# Patient Record
(demographics unavailable — no encounter records)

---

## 2024-12-12 NOTE — PROCEDURE
[] : The injection was done in 2 phases with the first phase being subcutaneous injection of lidocaine 1.5 cc subcutaneously as anesthetic. When adequate level of anesthesia was achieved, the Celestone injection was undertaken. [4th] : 4th finger [FreeTextEntry1] : KENALOG 40 mg injected; NO Celestone injected.

## 2024-12-12 NOTE — ASSESSMENT
[FreeTextEntry1] : Patient has new problem today, trigger finger left ring finger.  Patient was aware of A1 pulley tenderness and some weakness or interference with making tight fist and holding objects but triggering was less apparent. Physical exam shows obvious locking and triggering of left ring finger. I have reviewed treatment options risks and complications with patient.  Questions answered.  Following this patient requested and was treated with 2 phase cortisone injection into the left ring finger flexor sheath utilizing Kenalog 40 mg that was well-tolerated. Patient has no other active trigger finger at this time.  Patient has residual ulnar nerve injury that he is compensated for and seeks no treatment for. Patient has bilateral wrist SLAC but has modified activities and has infrequent pain.  Patient seeks no treatment for these other conditions today.  Prognosis uncertain. Patient should return if symptoms continue or recur.  Patient should return if he has new problems or recurrent problems for evaluation.  A lengthy and detailed discussion was held with the patient regarding analysis, treatment, and recommendations. All questions have been answered. At the conclusion the patient expressed acceptance, understanding and agreement with the plan.

## 2024-12-12 NOTE — PHYSICAL EXAM
[de-identified] : left wrist: dorso radial swelling, c/w synovitis and wrist arthritis. Tenderness over radial scaphoid interval Extension 30 degrees/flexion 45 degrees. Pain at maximum wrist extension and flexion. Basal joint manipulation 1+ crepitus, mild discomfort  Left hand Ring finger A1 pulley mildly tender.  Recreates complaint. With tight composite active fist, ring finger locks and then triggers into extension with pain recreating symptoms. There is no other A1 pulley tenderness or triggering in any other finger, left hand. Minimal Dupuytren's nodules across DPC with small cords but no contractures. No pertinent MP, PIP, or DIP joint contributory findings, except some Heberden's nodes; none are clinically painful.  Right Wrist: Swelling RS interval Mod RS jointline tenderness Shift test: stable, no pain Joint crepitus 2+, mild pain  Right hand: Dupuytren's disease fourth ray.  Fourth ray cord, DPC nodule without contracture. Ring finger A1 pulley nontender, not triggering with active fist or provocable testing. PIP 4 contracture 30 degrees. With maximum extension force applied PIP extends -10 degrees. No palpable Dupuytren's cord within ring finger. No other A1 pulley tenderness and no other triggering in any finger. No pertinent MP, PIP, or DIP joint contributory findings, except some Heberden's nodes; none are clinically painful.  Neurologic Right hand Ulnar innervated muscle atrophy, chronic, severe  Phalen's test with median nerve compression: negative. Left hand Sensation intact all fingers at rest Carpal tunnel surgical scar well-healed Phalen's test with median nerve compression: Negative  Skin: No cyanosis, clubbing, edema or rashes. Vascular: Radial pulses intact. Lymphatic: No streaking or epitrochlear adenopathy. The patient is awake, alert, and oriented. Affect appropriate. Cooperative.

## 2024-12-12 NOTE — PHYSICAL EXAM
[de-identified] : left wrist: dorso radial swelling, c/w synovitis and wrist arthritis. Tenderness over radial scaphoid interval Extension 30 degrees/flexion 45 degrees. Pain at maximum wrist extension and flexion. Basal joint manipulation 1+ crepitus, mild discomfort  Left hand Ring finger A1 pulley mildly tender.  Recreates complaint. With tight composite active fist, ring finger locks and then triggers into extension with pain recreating symptoms. There is no other A1 pulley tenderness or triggering in any other finger, left hand. Minimal Dupuytren's nodules across DPC with small cords but no contractures. No pertinent MP, PIP, or DIP joint contributory findings, except some Heberden's nodes; none are clinically painful.  Right Wrist: Swelling RS interval Mod RS jointline tenderness Shift test: stable, no pain Joint crepitus 2+, mild pain  Right hand: Dupuytren's disease fourth ray.  Fourth ray cord, DPC nodule without contracture. Ring finger A1 pulley nontender, not triggering with active fist or provocable testing. PIP 4 contracture 30 degrees. With maximum extension force applied PIP extends -10 degrees. No palpable Dupuytren's cord within ring finger. No other A1 pulley tenderness and no other triggering in any finger. No pertinent MP, PIP, or DIP joint contributory findings, except some Heberden's nodes; none are clinically painful.  Neurologic Right hand Ulnar innervated muscle atrophy, chronic, severe  Phalen's test with median nerve compression: negative. Left hand Sensation intact all fingers at rest Carpal tunnel surgical scar well-healed Phalen's test with median nerve compression: Negative  Skin: No cyanosis, clubbing, edema or rashes. Vascular: Radial pulses intact. Lymphatic: No streaking or epitrochlear adenopathy. The patient is awake, alert, and oriented. Affect appropriate. Cooperative.

## 2024-12-12 NOTE — HISTORY OF PRESENT ILLNESS
[FreeTextEntry1] : Patient is 67 yo RHD male first seen 10/29/2015 and most recently seen 2/15/2024.  Hand past history: 1. Lt SLAC II 2. Rt SLAC 3. L3 mallet deformity 4. Lt CTR 10/19/2016 5. Rt CTR 12/22/2020 6.  R4 trigger injection 2/15/2024 7.  L3 trigger injection 2/15/2024 8.  Rt chronic ulnar neuropathy; right hand muscle atrophy and wasting 9.  Right hand atrophy with normal sensation.  TODAY: Patient presents as a RETURN patient for hand evaluation. NEW PROBLEM: The patient reports pain in left ring finger "pain when moving". Patient not consciously aware of triggering but does feel patient states that" when making a power fist and extending finger from a fully flexed position. Patient has no other active trigger finger. Ulnar innervated atrophy right hand unchanged. Patient states that since he stopped working heavily with his hands many of his pains and other problems have subsided. Patient still has some numbness in the ulnar little fingers but much better than previously. Patient not aware of other numbness or tingling since carpal tunnel release surgeries 2016 and 20 T20.

## 2024-12-12 NOTE — PHYSICAL EXAM
[de-identified] : left wrist: dorso radial swelling, c/w synovitis and wrist arthritis. Tenderness over radial scaphoid interval Extension 30 degrees/flexion 45 degrees. Pain at maximum wrist extension and flexion. Basal joint manipulation 1+ crepitus, mild discomfort  Left hand Ring finger A1 pulley mildly tender.  Recreates complaint. With tight composite active fist, ring finger locks and then triggers into extension with pain recreating symptoms. There is no other A1 pulley tenderness or triggering in any other finger, left hand. Minimal Dupuytren's nodules across DPC with small cords but no contractures. No pertinent MP, PIP, or DIP joint contributory findings, except some Heberden's nodes; none are clinically painful.  Right Wrist: Swelling RS interval Mod RS jointline tenderness Shift test: stable, no pain Joint crepitus 2+, mild pain  Right hand: Dupuytren's disease fourth ray.  Fourth ray cord, DPC nodule without contracture. Ring finger A1 pulley nontender, not triggering with active fist or provocable testing. PIP 4 contracture 30 degrees. With maximum extension force applied PIP extends -10 degrees. No palpable Dupuytren's cord within ring finger. No other A1 pulley tenderness and no other triggering in any finger. No pertinent MP, PIP, or DIP joint contributory findings, except some Heberden's nodes; none are clinically painful.  Neurologic Right hand Ulnar innervated muscle atrophy, chronic, severe  Phalen's test with median nerve compression: negative. Left hand Sensation intact all fingers at rest Carpal tunnel surgical scar well-healed Phalen's test with median nerve compression: Negative  Skin: No cyanosis, clubbing, edema or rashes. Vascular: Radial pulses intact. Lymphatic: No streaking or epitrochlear adenopathy. The patient is awake, alert, and oriented. Affect appropriate. Cooperative.

## 2025-03-26 NOTE — CARDIOLOGY SUMMARY
[de-identified] : 3/26/25: Sinus rhythm 61 bpm.  9/4/24 sinus rhythm 65 bpm, RBBB 4/17/24 sinus rhythm 87 bpm, RBBB, PVCx 1 10/18/23 sinus rhythm 64 bpm, RBBB 5/17/23 sinus rhythm 71 bpm, RBBB [de-identified] : Stress 9/13/22 normal nuclear stress test  [de-identified] : TTE 9/20/22 LVEF 52%. Sigmoid septum with mid basal septal thickening, LA 4.2cm, sinus up to 4.7cm, mod AI, trace TR, RVSP <25  [de-identified] : Cardiac CT 2/2022- Coronary artery calcium score 401, 77th %ile

## 2025-03-26 NOTE — HISTORY OF PRESENT ILLNESS
[FreeTextEntry1] : 68 year old gentleman with history of Marfans syndrome, prior alcohol dependence, anxiety, cervical disk disease, HTN, HLD, SHAAN (not compliant with CPAP), and paroxysmal atrial fibrillation s/p prior ablations, presenting for followup of s/p recent AF ablation 7/22/24 (PFA, PVI, PWI, SVC isolation).  He has a long history of AF which was initially persistent. Prior to his first ablation in 2013 he required multiple cardioversions. He had followed with Dr Kevin Marti for many years and initially underwent ablation in 2013 at [facility]. He did well but had recurrent AF noted around 2017 (and likely before this as well) and underwent another ablation with Dr Marti at [facility]. Since that time, he has had recurrent episodes of AF lasting up to 2 days. During episodes he has noted very rapid rates >200 bpm in this setting.  He feels moderate palpitation during this and exercise intolerance.  He has since been taking Toprol 50mg qd and Eliquis.   On 7/22/24 he underwent redo ablation performed at [facility] with PFA, which included PVI, PWI and SVC isolation. He was noted to have recovered conduction in the left PVs, and otherwise normal LA voltage.   He has done well since ablation. He stopped anticoagulation a few months post ablation due to the cost of Eliquis, and desire to avoid long-term anticoagulation.   He states that he received the COVID vaccine and the flu vaccine on Jan 22, 2025, and shortly after in February he felt he was back in AF (fluttering and palpitations) for ~ 24 hours. [place] [facility] [place] extra dose of Metoprolol and has stayed on 50mg Metoprolol since. He did not restart Eliquis. Since then, he denies any recurrence of arrythmia or symptoms such as palpitations, dizziness, SOB, syncope or near syncope. He states he feels easily triggered by stressful events and would like to consider medication for anxiety as he believes in the past this also led to the AF occurrences in the past. He is an avid hiker, and hikes in remote areas often and is concerned if he falls in a remote area and sustains a head injury on a blood thinner it will be detrimental to him.   ECG today reveals NSR HR 61 bpm  Of note, ILR was denied by his insurance company.  He has also refused to wear smart watches.   He notes that he previously had not been able to stay on CPAP. He recently had another sleep study and was referred for an oral appliance.   Current meds include ASA 81mg, Toprol 50 mg qd, sildenafil 50, ramipril 5, Trazodone

## 2025-03-26 NOTE — REVIEW OF SYSTEMS
[Negative] : Heme/Lymph [Anxiety] : anxiety [Feeling Fatigued] : not feeling fatigued [SOB] : no shortness of breath [Dyspnea on exertion] : not dyspnea during exertion [Chest Discomfort] : no chest discomfort [Lower Ext Edema] : no extremity edema [Leg Claudication] : no intermittent leg claudication [Palpitations] : no palpitations [Syncope] : no syncope [Cough] : no cough [Dizziness] : no dizziness [Convulsions] : no convulsions [Easy Bleeding] : no tendency for easy bleeding [Easy Bruising] : no tendency for easy bruising [FreeTextEntry5] : see HPI

## 2025-03-26 NOTE — PHYSICAL EXAM
[Well Developed] : well developed [Well Nourished] : well nourished [No Acute Distress] : no acute distress [Normal Conjunctiva] : normal conjunctiva [Normal S1, S2] : normal S1, S2 [No Respiratory Distress] : no respiratory distress  [Normal Gait] : normal gait [No Edema] : no edema [No Cyanosis] : no cyanosis [No Clubbing] : no clubbing [No Rash] : no rash [Moves all extremities] : moves all extremities [No Focal Deficits] : no focal deficits [Alert and Oriented] : alert and oriented [de-identified] : thick neck [Clear Lung Fields] : clear lung fields

## 2025-03-26 NOTE — PHYSICAL EXAM
[Well Developed] : well developed [Well Nourished] : well nourished [No Acute Distress] : no acute distress [Normal Conjunctiva] : normal conjunctiva [Normal S1, S2] : normal S1, S2 [No Respiratory Distress] : no respiratory distress  [Normal Gait] : normal gait [No Edema] : no edema [No Cyanosis] : no cyanosis [No Clubbing] : no clubbing [No Rash] : no rash [Moves all extremities] : moves all extremities [No Focal Deficits] : no focal deficits [Alert and Oriented] : alert and oriented [de-identified] : thick neck [Clear Lung Fields] : clear lung fields

## 2025-03-26 NOTE — CARDIOLOGY SUMMARY
[de-identified] : 3/26/25: Sinus rhythm 61 bpm.  9/4/24 sinus rhythm 65 bpm, RBBB 4/17/24 sinus rhythm 87 bpm, RBBB, PVCx 1 10/18/23 sinus rhythm 64 bpm, RBBB 5/17/23 sinus rhythm 71 bpm, RBBB [de-identified] : Stress 9/13/22 normal nuclear stress test  [de-identified] : TTE 9/20/22 LVEF 52%. Sigmoid septum with mid basal septal thickening, LA 4.2cm, sinus up to 4.7cm, mod AI, trace TR, RVSP <25  [de-identified] : Cardiac CT 2/2022- Coronary artery calcium score 401, 77th %ile

## 2025-03-26 NOTE — DISCUSSION/SUMMARY
[EKG obtained to assist in diagnosis and management of assessed problem(s)] : EKG obtained to assist in diagnosis and management of assessed problem(s) [FreeTextEntry1] : 68 year old gentleman with history of Marfans syndrome, prior alcohol dependence, anxiety, cervical disk disease, HTN, HLD, SHAAN (not compliant with CPAP), and paroxysmal atrial fibrillation s/p prior ablations, presenting for followup of s/p recent AF ablation 7/22/24 (PFA, PVI, PWI, SVC isolation).  He is doing well since his recent ablation. He reports one episode in February where be believes he went back into AF for ~ 24 hours. He felt fluttering and palpitation. He was able to take extra dose of Metoprolol with symptom relief and has been maintained on Toprol 50mg QD.  He is in sinus rhythm today and otherwise has remained in sinus rhythm on followup.   He has stopped anticoagulation due to concerns about cost and bleeding risks.   He has a CHADSVASc=2 (age, HTN), and is concerned about long-term anticoagulation.  His risk of stroke is low-intermediate particularly in setting of very low AF burden, and I do think it would be reasonable to avoid long-term anticoagulation if no further prolonged AF is noted.  He is not interested in having a smart watch or using a Sinch viral for monitoring as it will trigger him to become anxious/he will not use it much. ILR was previously denied by insurance.  At this point he will wear a monitor for 2 weeks for AF surveillance. If AF is seen on monitor or if he has recurrence in symptoms/more events he is agreeable to start OAC again. Due to high cost of Eliquis, he would trial Dabigatran in that case.  Recommendations:  -Repeat monitoring for 2 weeks (Zio), for AF surveillance -If recurrence of AF lasting >24 hours, start Dabigatran 150mg  -Continue Toprol 50mg  -Report any arrythmia symptoms to office.  -EP follow-up in 6 months or sooner PRN

## 2025-03-26 NOTE — HISTORY OF PRESENT ILLNESS
[FreeTextEntry1] : 68 year old gentleman with history of Marfans syndrome, prior alcohol dependence, anxiety, cervical disk disease, HTN, HLD, SHAAN (not compliant with CPAP), and paroxysmal atrial fibrillation s/p prior ablations, presenting for followup of s/p recent AF ablation 7/22/24 (PFA, PVI, PWI, SVC isolation).  He has a long history of AF which was initially persistent. Prior to his first ablation in 2013 he required multiple cardioversions. He had followed with Dr Kevin Marti for many years and initially underwent ablation in 2013 at [facility]. He did well but had recurrent AF noted around 2017 (and likely before this as well) and underwent another ablation with Dr Marti at [facility]. Since that time, he has had recurrent episodes of AF lasting up to 2 days. During episodes he has noted very rapid rates >200 bpm in this setting.  He feels moderate palpitation during this and exercise intolerance.  He has since been taking Toprol 50mg qd and Eliquis.   On 7/22/24 he underwent redo ablation performed at [facility] with PFA, which included PVI, PWI and SVC isolation. He was noted to have recovered conduction in the left PVs, and otherwise normal LA voltage.   He has done well since ablation. He stopped anticoagulation a few months post ablation due to the cost of Eliquis, and desire to avoid long-term anticoagulation.   He states that he received the COVID vaccine and the flu vaccine on Jan 22, 2025, and shortly after in February he felt he was back in AF (fluttering and palpitations) for ~ 24 hours. He spoke with the NP at [facility] and took on extra dose of Metoprolol and has stayed on 50mg Metoprolol since. He did not restart Eliquis. Since then, he denies any recurrence of arrythmia or symptoms such as palpitations, dizziness, SOB, syncope or near syncope. He states he feels easily triggered by stressful events and would like to consider medication for anxiety as he believes in the past this also led to the AF occurrences in the past. He is an avid hiker, and hikes in remote areas often and is concerned if he falls in a remote area and sustains a head injury on a blood thinner it will be detrimental to him.   ECG today reveals NSR HR 61 bpm  Of note, ILR was denied by his insurance company.  He has also refused to wear smart watches.   He notes that he previously had not been able to stay on CPAP. He recently had another sleep study and was referred for an oral appliance.   Current meds include ASA 81mg, Toprol 50 mg qd, sildenafil 50, ramipril 5, Trazodone

## 2025-03-26 NOTE — DISCUSSION/SUMMARY
[EKG obtained to assist in diagnosis and management of assessed problem(s)] : EKG obtained to assist in diagnosis and management of assessed problem(s) [FreeTextEntry1] : 68 year old gentleman with history of Marfans syndrome, prior alcohol dependence, anxiety, cervical disk disease, HTN, HLD, SHAAN (not compliant with CPAP), and paroxysmal atrial fibrillation s/p prior ablations, presenting for followup of s/p recent AF ablation 7/22/24 (PFA, PVI, PWI, SVC isolation).  He is doing well since his recent ablation. He reports one episode in February where be believes he went back into AF for ~ 24 hours. He felt fluttering and palpitation. He was able to take extra dose of Metoprolol with symptom relief and has been maintained on Toprol 50mg QD.  He is in sinus rhythm today and otherwise has remained in sinus rhythm on followup.   He has stopped anticoagulation due to concerns about cost and bleeding risks.   He has a CHADSVASc=2 (age, HTN), and is concerned about long-term anticoagulation.  His risk of stroke is low-intermediate particularly in setting of very low AF burden, and I do think it would be reasonable to avoid long-term anticoagulation if no further prolonged AF is noted.  He is not interested in having a smart watch or using a MODLOFT viral for monitoring as it will trigger him to become anxious/he will not use it much. ILR was previously denied by insurance.  At this point he will wear a monitor for 2 weeks for AF surveillance. If AF is seen on monitor or if he has recurrence in symptoms/more events he is agreeable to start OAC again. Due to high cost of Eliquis, he would trial Dabigatran in that case.  Recommendations:  -Repeat monitoring for 2 weeks (Zio), for AF surveillance -If recurrence of AF lasting >24 hours, start Dabigatran 150mg  -Continue Toprol 50mg  -Report any arrythmia symptoms to office.  -EP follow-up in 6 months or sooner PRN

## 2025-05-27 NOTE — PHYSICAL EXAM
[de-identified] : General Exam   Well developed, well nourished No apparent distress Oriented to person, place, and time Mood: Normal Affect: Normal Balance and coordination: Normal Gait: Normal  Left shoulder exam   Inspection: No swelling, ecchymosis or gross deformity. Skin: No masses, No lesions Tenderness: No bicipital tenderness, no tenderness to the greater tuberosity/RTC insertion, no anterior shoulder/lesser tuberosity tenderness. No tenderness SC joint, clavicle, AC joint. ROM: 160/60/T6 Impingement tests: Positive Castaneda AC Joint: no pain with cross arm testing Biceps: Negative speed Strength: 4/5 abduction, 5/5 external rotation, and 5-/5 internal rotation Neuro: AIN, PIN, Ulnar nerve motor intact Sensation: Intact to light touch in radial, median, ulnar, and axillary nerve distributions Vasc: 2+ radial pulse  Right hip exam   Skin: Clean/dry and intact Inspection: No obvious deformity, no swelling, no ecchymosis. Tenderness:  no tenderness over greater trochanter/glut medius insertion. No tenderness pubic symphysis, pubic tubercle, hip flexors. No ttp ischial tuberosity or buttock. No ttp over the ASIS/Illiac crest. ROM: 0-120. Internal rotation 30 external rotation 70 Painful ROM: None Additional tests: No pain with circumduction negative impingement test at 90 mildly positive impingement test at 60 negative Eliza negative Stinchfield Strength: 5/5 hip flexion/ADD/ABD/Q/H/TA/GS/EHL Neuro: Sensation in tact to light touch throughout in dp/sp/tib/michael/saph distributions Pulses: 2+ DP/PT pulses [de-identified] : The following radiographs were ordered and read by me during this patients visit. I reviewed each radiograph in detail with the patient and discussed the findings as highlighted below.  3 views left shoulder were obtained today glenohumeral joint well-maintained normal alignment no fracture.  AP pelvis radiographs obtained today hip joint spaces well-maintained normal alignment no fracture

## 2025-05-27 NOTE — HISTORY OF PRESENT ILLNESS
[de-identified] :  PAWEL JOHNSON is a 68-year-old male presenting to the office complaining of left shoulder pain. Patient reports pain since April 2025. Patient denies injury or trauma to the area. The patient describes the pain as a dull aching, and occasionally sharp pain localized to the anterior aspect of his left shoulder that is intermittent in nature The pain radiates down the lateral aspect of his upper arm.  His symptoms are worsened by lifting, repetitive use/activity, lying on the affected side, and overhead activities. Patient reports the pain is waking him up at night.  Patient reports associated weakness. Denies numbness and tingling in the upper extremity.  Patient has completed a home exercise program noting improvements in strength and range of motion but not pain. He is also reporting right hip pain, chronic in nature with recent exacerbation of pain he attributes to his new care. He reports pain is localized to the right buttocks and posterior aspect of the right thigh. Pain is improved with rest and stretching, exacerbated with prolonged sitting/driving.  Patient is taking NSAIDs/ Tylenol for pain relief with mild to moderate relief in symptoms.   The patient's past medical history, past surgical history, medications, allergies, and social history were reviewed by me today with the patient and documented accordingly. In addition, the patient's family history, which is noncontributory to this visit, was also reviewed.

## 2025-05-27 NOTE — DISCUSSION/SUMMARY
[de-identified] : Longstanding left shoulder pain significant weakness with rotator cuff testing is weakness with external rotation and abduction is concern for rotator cuff tearing he is extensive conservative care with greater than 6 weeks of anti-inflammatory medications and physical therapy exercises. Will obtain MRI to assess his rotator cuff he will follow-up after MRI. All questions answered.  Given prescription for Mobic side effects discussed  In terms of his hip pain seems muscular within his hamstring recommend physical therapy

## 2025-06-24 NOTE — HISTORY OF PRESENT ILLNESS
[de-identified] :  PAWEL JOHNSON is a 68-year-old male presenting to the office complaining of left shoulder pain. Patient reports pain since April 2025. Patient denies injury or trauma to the area. The patient describes the pain as a dull aching, and occasionally sharp pain localized to the anterior aspect of his left shoulder that is intermittent in nature The pain radiates down the lateral aspect of his upper arm.  His symptoms are worsened by lifting, repetitive use/activity, lying on the affected side, and overhead activities. Patient reports the pain is waking him up at night.  Patient reports associated weakness. Denies numbness and tingling in the upper extremity.  Patient has completed a home exercise program noting improvements in strength and range of motion but not pain. He is also reporting right hip pain, chronic in nature with recent exacerbation of pain he attributes to his new care. He reports pain is localized to the right buttocks and posterior aspect of the right thigh. Pain is improved with rest and stretching, exacerbated with prolonged sitting/driving.  Patient is taking NSAIDs/ Tylenol for pain relief with mild to moderate relief in symptoms.   The patient's past medical history, past surgical history, medications, allergies, and social history were reviewed by me today with the patient and documented accordingly. In addition, the patient's family history, which is noncontributory to this visit, was also reviewed.

## 2025-06-24 NOTE — DISCUSSION/SUMMARY
[de-identified] : MRI left shoulder reviewed there is a partial thickness tear of the rotator cuff.  We discussed treatment options at length we discussed nonoperative care activity modification physical therapy with likely continued weakness we discussed surgical management with left shoulder arthroscopy rotator cuff repair.  Recommend conservative treatment at this time. Physical therapy, home exercise program/stretching and strengthening.  Over the counter NSAIDs / Tylenol as tolerated, as needed for pain, side effects discussed.  All questions answered.  Injection: Left shoulder (Subacromial). Indication: [Impingement   A discussion was had with the patient regarding this procedure and all questions were answered. All risks, benefits and alternatives were discussed. These included but were not limited to bleeding, infection, and allergic reaction. Alcohol was used to clean the skin, and betadine was used to sterilize and prep the area in the posterior aspect of the left shoulder. Ethyl chloride spray was then used as a topical anesthetic. A 21-gauge needle was used to inject 4cc of 1% lidocaine and 1cc of 40mg/ml methylprednisolone into the left subacromial space. A sterile bandage was then applied. The patient tolerated the procedure well and there were no complications.  Use ice, Tylenol, anti-inflammatory medication as needed. FU as needed.

## 2025-06-24 NOTE — PHYSICAL EXAM
[de-identified] : Left shoulder exam   Inspection: No swelling, ecchymosis or gross deformity. Skin: No masses, No lesions Tenderness: No bicipital tenderness, no tenderness to the greater tuberosity/RTC insertion, no anterior shoulder/lesser tuberosity tenderness. No tenderness SC joint, clavicle, AC joint. ROM: 160/60/T6 Impingement tests: Positive Castaneda AC Joint: no pain with cross arm testing Biceps: Negative speed Strength: 4/5 abduction, 5/5 external rotation, and 5-/5 internal rotation Neuro: AIN, PIN, Ulnar nerve motor intact Sensation: Intact to light touch in radial, median, ulnar, and axillary nerve distributions Vasc: 2+ radial pulse  Right hip exam   Skin: Clean/dry and intact Inspection: No obvious deformity, no swelling, no ecchymosis. Tenderness:  no tenderness over greater trochanter/glut medius insertion. No tenderness pubic symphysis, pubic tubercle, hip flexors. No ttp ischial tuberosity or buttock. No ttp over the ASIS/[place] crest. ROM: 0-120. Internal rotation 30 external rotation 70 Painful ROM: None Additional tests: No pain with circumduction negative impingement test at 90 mildly positive impingement test at 60 negative Eliza negative Stinchfield Strength: 5/5 hip flexion/ADD/ABD/Q/H/TA/GS/EHL Neuro: Sensation in tact to light touch throughout in dp/sp/tib/michael/saph distributions Pulses: 2+ DP/PT pulses [de-identified] : 	 EXAM: [id number] - MR SHOULDER LT  - ORDERED BY: RONNA CARTAGENA   PROCEDURE DATE:  06/04/2025    INTERPRETATION:  CLINICAL INFORMATION: Left shoulder pain.   CONTRAST: IV Contrast: NONE  TECHNIQUE: MRI of the LEFT SHOULDER was performed.  FINDINGS:  LOCALIZER: No additional findings.  OSSEOUS STRUCTURES: No fracture. Marrow edema at the anterior aspect of the greater trochanter .  GLENOHUMERAL JOINT: High-grade cartilage fissures at the anterior and anterior superior glenoid with underlying subcortical cystic change. No effusion.  LABRUM: Nondisplaced intrasubstance tear of the superior anterior to superior posterior labrum additional focal tear at the posterior labrum.  ACROMIOCLAVICULAR JOINT: Mild acromioclavicular joint osteoarthritis with undersurface osseous productive changes.  ROTATOR CUFF AND BURSA: High-grade non retracted partial-thickness tear of the supraspinatus with loss of morphology of the bursal and interstitial anterior fibers at its attachment and more posteriorly, moderate grade interstitial tearing of the fibers proximal to the footprint. No retraction. Mild infraspinatus tendinosis. Low-grade partial tear of the cranial one third insertional fibers of subscapularis. Teres minor tendon is intact. Mild subacromial/subdeltoid bursal fluid  MUSCLES: Minimal fat atrophy of the cranial one third subscapularis.  LONG HEAD BICEPS TENDON: Slight medial asymmetric alignment of the biceps tendon within the bicipital groove. Moderate to severe intra-articular long head biceps tendinosis.  NERVES: Multilobulated cystic structure in the spinoglenoid notch which appears to have areas of internal hypointensity favored to reflect calcifications measuring 1.4 x 1.3 cm in the coronal plane,, which may represent a small vascular malformation versus cyst.  SUBCUTANEOUS TISSUES: Normal.  IMPRESSION: 1.  High-grade partial tearing of the supraspinatus without retraction or atrophy. Associated underlying reactive marrow edema within the anterior aspect of the greater tuberosity. 2.  Low-grade partial tear of the cranial one third insertional fibers of subscapularis. 3.  Mild to moderate glenohumeral joint osteoarthritis. 4.  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 No infraspinatus atrophy.  --- End of Report ---